# Patient Record
Sex: FEMALE | Race: WHITE | NOT HISPANIC OR LATINO | Employment: FULL TIME | URBAN - METROPOLITAN AREA
[De-identification: names, ages, dates, MRNs, and addresses within clinical notes are randomized per-mention and may not be internally consistent; named-entity substitution may affect disease eponyms.]

---
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
## 2023-11-01 ENCOUNTER — HOSPITAL ENCOUNTER (EMERGENCY)
Facility: MEDICAL CENTER | Age: 23
End: 2023-11-01
Attending: EMERGENCY MEDICINE
Payer: COMMERCIAL

## 2023-11-01 ENCOUNTER — APPOINTMENT (OUTPATIENT)
Dept: RADIOLOGY | Facility: MEDICAL CENTER | Age: 23
End: 2023-11-01
Attending: EMERGENCY MEDICINE
Payer: COMMERCIAL

## 2023-11-01 VITALS
TEMPERATURE: 98.2 F | HEIGHT: 72 IN | WEIGHT: 131.84 LBS | OXYGEN SATURATION: 98 % | DIASTOLIC BLOOD PRESSURE: 70 MMHG | SYSTOLIC BLOOD PRESSURE: 105 MMHG | HEART RATE: 98 BPM | RESPIRATION RATE: 18 BRPM | BODY MASS INDEX: 17.86 KG/M2

## 2023-11-01 DIAGNOSIS — S60.221A CONTUSION OF RIGHT HAND, INITIAL ENCOUNTER: ICD-10-CM

## 2023-11-01 DIAGNOSIS — F10.929 ALCOHOLIC INTOXICATION WITH COMPLICATION (HCC): ICD-10-CM

## 2023-11-01 PROCEDURE — 73130 X-RAY EXAM OF HAND: CPT | Mod: RT

## 2023-11-01 PROCEDURE — A9270 NON-COVERED ITEM OR SERVICE: HCPCS | Performed by: EMERGENCY MEDICINE

## 2023-11-01 PROCEDURE — 99284 EMERGENCY DEPT VISIT MOD MDM: CPT

## 2023-11-01 PROCEDURE — 700102 HCHG RX REV CODE 250 W/ 637 OVERRIDE(OP): Performed by: EMERGENCY MEDICINE

## 2023-11-01 RX ORDER — IBUPROFEN 600 MG/1
600 TABLET ORAL ONCE
Status: COMPLETED | OUTPATIENT
Start: 2023-11-01 | End: 2023-11-01

## 2023-11-01 RX ORDER — HYDROCODONE BITARTRATE AND ACETAMINOPHEN 5; 325 MG/1; MG/1
1 TABLET ORAL ONCE
Status: COMPLETED | OUTPATIENT
Start: 2023-11-01 | End: 2023-11-01

## 2023-11-01 RX ADMIN — HYDROCODONE BITARTRATE AND ACETAMINOPHEN 1 TABLET: 5; 325 TABLET ORAL at 06:40

## 2023-11-01 RX ADMIN — IBUPROFEN 600 MG: 600 TABLET, FILM COATED ORAL at 06:30

## 2023-11-01 ASSESSMENT — PAIN DESCRIPTION - PAIN TYPE
TYPE: ACUTE PAIN
TYPE: ACUTE PAIN

## 2023-11-01 NOTE — ED NOTES
Pt discharged . GCS 15. Pt in possession of belongings. Pt provided discharge education and information pertaining to medications and follow up appointments. Pt received copy of discharge instructions.    Vitals:    11/01/23 0600   BP: 105/70   Pulse: 98   Resp: 18   Temp: 36.8 °C (98.2 °F)   SpO2: 98%

## 2023-11-01 NOTE — ED TRIAGE NOTES
Chief Complaint   Patient presents with    Hand Injury     Right had swelling and bruising. Pt punched wall stud.  Hx of boxer fracture.  +ETOH

## 2023-11-01 NOTE — ED PROVIDER NOTES
"ED Provider Note    CHIEF COMPLAINT  Chief Complaint   Patient presents with    Hand Injury     Right had swelling and bruising. Pt punched wall stud.  Hx of boxer fracture.  +ETOH     EXTERNAL RECORDS REVIEWED  none    HPI/ROS  LIMITATION TO HISTORY   Select: Intoxication  OUTSIDE HISTORIAN(S):  [facility] a 23 y.o. female who presents emergency room for evaluation as worsening right-sided hand pain and bruising and swelling.  Patient had had several drinks tonight and states that she likes to punch things when she gets intoxicated.  She stated that she had punched a wall stud and had immediate pain and discomfort.  She has a prior boxer's fracture that was nonsurgically repaired and states that she has pain and discomfort in the same area.  Easy to observe swelling of the dorsum of the hand with generalized pain and discomfort over the bodies of the third through fifth metacarpals.  She has no obvious angulation or pain or discomfort at the wrist, through the forearm or elsewhere on the extremity.  She denies any numbness or tingling.  Pain is exacerbated with attempts to manipulate her third through fifth digits    PAST MEDICAL HISTORY   none    SURGICAL HISTORY  patient denies any surgical history    FAMILY HISTORY  No family history on file.    SOCIAL HISTORY  Social History     Tobacco Use    Smoking status: Never    Smokeless tobacco: Never   Vaping Use    Vaping Use: Every day   Substance and Sexual Activity    Alcohol use: Not Currently    Drug use: Yes     Comment: Mary    Sexual activity: Not on file       CURRENT MEDICATIONS  Home Medications    **Home medications have not yet been reviewed for this encounter**         ALLERGIES  Allergies   Allergen Reactions    Bee Venom        PHYSICAL EXAM  VITAL SIGNS: BP 96/70   Pulse (!) 113   Temp 35.8 °C (96.5 °F) (Temporal)   Resp 16   Ht 1.854 m (6' 1\")   Wt 59.8 kg (131 lb 13.4 oz)   LMP 10/01/2023 (Exact Date)   " SpO2 97%   BMI 17.39 kg/m²    Genl: F sitting in chair uncomfortably, speaking slurred, appears in no acute distress   Head: NC/AT   Pulmonary: Lungs are clear to auscultation bilaterally  CV:  RRR, no murmur appreciated, pulses 2+ in both upper and lower extremities,  Abdomen: soft, NT/ND  Musculoskeletal: Pain free ROM of the neck. Moving upper and lower extremities in spontaneous and coordinated fashion  Right Upper Extremity  - Skin:  Over the dorsum of the hand, pain with manipulation of the third through fifth metacarpal joints.  No abrasions, no lacerations, no ecchymosis  - Motor: Full ROM at shoulder, elbow, wrist; 5/5 wrist flexion/extension, thumb IP joint flexion/extension (AIN/PIN), abduction/adduction (ulnar) intact  - Sensation intact to median/ulnar/radial nerves  - 2+ radial pulse, < 2 sec cap refill x 5 digits  ?  DIAGNOSTIC STUDIES / PROCEDURES  RADIOLOGY  I have independently interpreted the diagnostic imaging associated with this visit and am waiting the final reading from the radiologist.   My preliminary interpretation is as follows: No evidence of acute traumatic injury, remote appearing ulnar styloid fracture and possibly distal fifth metacarpal fracture  Radiologist interpretation:   DX-HAND 3+ RIGHT   Final Result         1.  No acute traumatic bony injury.   2.  Remote appearing ulnar styloid fracture.        COURSE & MEDICAL DECISION MAKING    ED Observation Status? No; Patient does not meet criteria for ED Observation.     INITIAL ASSESSMENT, COURSE AND PLAN  Care Narrative: Seen and evaluated for symptoms as described above.  The patient appears intoxicated, has swelling on the dorsum of the hand suggesting underlying traumatic injury.  There is no open fractures, there is no obvious evidence of pain at the wrist, elbow or forearm.  X-rays were obtained and thankfully showed no evidence of acute traumatic bony injury or evidence of subluxation.  Images are discussed at the bedside,  she can be placed in a splint for comfort and if pain persist in 7 to 10 days and nondisplaced fracture may be missed on some of these acute x-rays and repeat imaging would be warranted at that time.  After the patient is sober, OTC medications would be indicated.    DISPOSITION AND DISCUSSIONS  I have discussed management of the patient with the following physicians and MARITZA's:  none    Discussion of management with other QHP or appropriate source(s): None     Escalation of care considered, and ultimately not performed:IV fluids and blood analysis    Barriers to care at this time, including but not limited to: Patient does not have established PCP.     FINAL DIAGNOSIS  1. Contusion of right hand, initial encounter    2. Alcoholic intoxication with complication (HCC)      Electronically signed by: Bladimir Marroquin M.D., 11/1/2023 4:59 AM